# Patient Record
Sex: FEMALE | Race: WHITE | Employment: UNEMPLOYED | URBAN - METROPOLITAN AREA
[De-identification: names, ages, dates, MRNs, and addresses within clinical notes are randomized per-mention and may not be internally consistent; named-entity substitution may affect disease eponyms.]

---

## 2021-01-18 ENCOUNTER — IMMUNIZATION (OUTPATIENT)
Dept: PEDIATRICS | Facility: CLINIC | Age: 56
End: 2021-01-18
Payer: COMMERCIAL

## 2021-01-18 PROCEDURE — 91300 PR COVID VAC PFIZER DIL RECON 30 MCG/0.3 ML IM: CPT

## 2021-01-18 PROCEDURE — 0001A PR COVID VAC PFIZER DIL RECON 30 MCG/0.3 ML IM: CPT

## 2021-02-08 ENCOUNTER — IMMUNIZATION (OUTPATIENT)
Dept: PEDIATRICS | Facility: CLINIC | Age: 56
End: 2021-02-08
Attending: OTOLARYNGOLOGY
Payer: COMMERCIAL

## 2021-02-08 PROCEDURE — 91300 PR COVID VAC PFIZER DIL RECON 30 MCG/0.3 ML IM: CPT

## 2021-02-08 PROCEDURE — 0002A PR COVID VAC PFIZER DIL RECON 30 MCG/0.3 ML IM: CPT

## 2021-03-07 ENCOUNTER — HEALTH MAINTENANCE LETTER (OUTPATIENT)
Age: 56
End: 2021-03-07

## 2021-10-04 ENCOUNTER — HOSPITAL ENCOUNTER (EMERGENCY)
Facility: CLINIC | Age: 56
Discharge: HOME | End: 2021-10-05
Attending: EMERGENCY MEDICINE | Admitting: EMERGENCY MEDICINE
Payer: COMMERCIAL

## 2021-10-04 DIAGNOSIS — T78.40XA ALLERGIC REACTION, INITIAL ENCOUNTER: ICD-10-CM

## 2021-10-04 PROCEDURE — 96374 THER/PROPH/DIAG INJ IV PUSH: CPT

## 2021-10-04 PROCEDURE — 99285 EMERGENCY DEPT VISIT HI MDM: CPT | Mod: 25

## 2021-10-04 PROCEDURE — 250N000011 HC RX IP 250 OP 636: Performed by: EMERGENCY MEDICINE

## 2021-10-04 PROCEDURE — 96375 TX/PRO/DX INJ NEW DRUG ADDON: CPT

## 2021-10-04 PROCEDURE — 250N000009 HC RX 250: Performed by: EMERGENCY MEDICINE

## 2021-10-04 RX ORDER — METHYLPREDNISOLONE SODIUM SUCCINATE 125 MG/2ML
125 INJECTION, POWDER, LYOPHILIZED, FOR SOLUTION INTRAMUSCULAR; INTRAVENOUS ONCE
Status: COMPLETED | OUTPATIENT
Start: 2021-10-04 | End: 2021-10-04

## 2021-10-04 RX ADMIN — METHYLPREDNISOLONE SODIUM SUCCINATE 125 MG: 125 INJECTION, POWDER, FOR SOLUTION INTRAMUSCULAR; INTRAVENOUS at 23:21

## 2021-10-04 RX ADMIN — FAMOTIDINE 20 MG: 10 INJECTION, SOLUTION INTRAVENOUS at 23:23

## 2021-10-04 RX ADMIN — EPINEPHRINE 0.3 MG: 1 INJECTION INTRAMUSCULAR; INTRAVENOUS; SUBCUTANEOUS at 23:12

## 2021-10-04 ASSESSMENT — ENCOUNTER SYMPTOMS
WHEEZING: 0
VOMITING: 0
FEVER: 0
NAUSEA: 0
ABDOMINAL PAIN: 0
SHORTNESS OF BREATH: 0
CHILLS: 0
DIARRHEA: 0

## 2021-10-04 ASSESSMENT — MIFFLIN-ST. JEOR: SCORE: 1369.26

## 2021-10-05 VITALS
OXYGEN SATURATION: 98 % | TEMPERATURE: 97.5 F | WEIGHT: 160 LBS | HEART RATE: 72 BPM | RESPIRATION RATE: 18 BRPM | SYSTOLIC BLOOD PRESSURE: 122 MMHG | HEIGHT: 68 IN | BODY MASS INDEX: 24.25 KG/M2 | DIASTOLIC BLOOD PRESSURE: 78 MMHG

## 2021-10-05 RX ORDER — EPINEPHRINE 0.3 MG/.3ML
0.3 INJECTION SUBCUTANEOUS ONCE
Qty: 0.3 ML | Refills: 0 | Status: SHIPPED | OUTPATIENT
Start: 2021-10-05 | End: 2021-10-05

## 2021-10-05 RX ORDER — FAMOTIDINE 20 MG/1
20 TABLET, FILM COATED ORAL 2 TIMES DAILY
Qty: 10 TABLET | Refills: 0 | Status: SHIPPED | OUTPATIENT
Start: 2021-10-05 | End: 2021-10-10

## 2021-10-05 RX ORDER — PREDNISONE 20 MG/1
40 TABLET ORAL DAILY
Qty: 10 TABLET | Refills: 0 | Status: SHIPPED | OUTPATIENT
Start: 2021-10-05 | End: 2021-10-10

## 2021-10-05 NOTE — ED NOTES
Pt rounding done. Pt lip is visibly less swollen as well as her tongue. Pt reports her voice sounds more like it usually does as well. Pt denies any sx at this time and reports feeling ready for discharge.

## 2021-10-05 NOTE — ED TRIAGE NOTES
Bit into an apple and developed swelling to lips and tongue. Also stated throat feels tight. Took benadryl about 45 minutes ago. Denies any sob currently.

## 2021-10-05 NOTE — ED NOTES
Pt discharge instructions reviewed. Use of epi pen at home, when and how to use it, as well as coming back to the ED following use, reviewed. Pt informed of when to return to the ED and how to care for self w/ any future reactions. Verbalized understanding. Denies needing anything prior to discharge. Airway and breathing normal and intact upon discharge.

## 2021-10-05 NOTE — ED NOTES
Pt rounding done, pt reports some improvement in sx following med adm. Pt denies needing anything at this time. Pt appears to be resting comfortably, airway intact, respirations regular and unlabored.

## 2021-10-05 NOTE — ED PROVIDER NOTES
History     Chief Complaint:  Oral Swelling      HPI   Ashley Ramirez is a 55 year old female who presents with tongue and lip swelling.  Patient reports that she was eating an apple this evening and shortly thereafter began experiencing significant swelling of her lower lip and tongue.  She notes that this is happened before she does not wash fruit sufficiently she will have a reaction to likely one of the pesticides on the skin of the flu.  She also notes significant allergy to bees and typically carries an EpiPen but did not have one with her this evening.  She took 2 Benadryl prior to coming into the emergency department and reports she feels mild improvement.  She denies any significant shortness of breath, nausea, vomiting, abdominal pain, dizziness, rash or itchiness.    Review of Systems   Constitutional: Negative for chills and fever.   HENT:        Lower lip and tongue swelling   Respiratory: Negative for shortness of breath and wheezing.    Cardiovascular: Negative for chest pain.   Gastrointestinal: Negative for abdominal pain, diarrhea, nausea and vomiting.   All other systems reviewed and are negative.    Allergies:  No Known Allergies    Medications:    EPINEPHrine (ANY BX GENERIC EQUIV) 0.3 MG/0.3ML injection 2-pack  famotidine (PEPCID) 20 MG tablet  predniSONE (DELTASONE) 20 MG tablet        Past Medical History:    No pertinent past medical history    Past Surgical History:    No pertinent past surgical history    Family History:    No family history of angioedema    Social History:  Presents to the emergency department by herself    Physical Exam     Patient Vitals for the past 24 hrs:   BP Temp Temp src Pulse Resp SpO2 Height Weight   10/05/21 0045 -- -- -- 64 -- 96 % -- --   10/05/21 0040 -- -- -- 63 -- 95 % -- --   10/05/21 0035 -- -- -- 75 -- 98 % -- --   10/05/21 0030 133/82 -- -- 74 -- 99 % -- --   10/05/21 0010 -- -- -- 60 -- 97 % -- --   10/05/21 0000 132/80 -- -- 64 -- 97 % -- --  "  10/04/21 2345 (!) 143/83 -- -- 60 -- 98 % -- --   10/04/21 2330 (!) 140/87 -- -- 65 -- 98 % -- --   10/04/21 2310 (!) 147/93 -- -- 74 -- -- -- --   10/04/21 2300 (!) 157/96 97.5  F (36.4  C) Temporal 75 18 98 % 1.727 m (5' 8\") 72.6 kg (160 lb)       Physical Exam  General: Patient is awake, alert and interactive when I enter the room  Head: The scalp, face, and head appear normal  Eyes: The pupils are equal, round, and reactive to light. Conjunctivae and sclerae are normal  ENT: Lower lip and mild frontal tongue swelling.  Able to tolerate secretions.  Neck: Normal range of motion.   CV: Regular rate and rhythm.   Resp: Lungs are clear without wheezes or rales. No respiratory distress.   GI: Abdomen is soft, no rigidity, guarding, or rebound. No distension. No tenderness to palpation in any quadrant.     MS: Normal tone. Joints grossly normal without effusions. No asymmetric leg swelling, calf or thigh tenderness.    Skin: No rash or lesions noted. Normal capillary refill noted  Neuro: Speech is normal and fluent. Face is symmetric. Moving all extremities.   Psych:  Normal affect.  Appropriate interactions.    Emergency Department Course     Emergency Department Course:    Reviewed:  I reviewed nursing notes, vitals and past history    Assessments:  2310 I obtained history and examined the patient as noted above.   0004 I rechecked the patient and she reports that her lip and tongue swelling have significantly improved.  We will continue to monitor the patient closely.   0056 patient continues to show steady improvement.  Comfortable with being discharged home.    Interventions:    Medications   lidocaine 1 % 0.1-1 mL (has no administration in time range)   sodium chloride (PF) 0.9% PF flush 3 mL (has no administration in time range)   sodium chloride (PF) 0.9% PF flush 3 mL (has no administration in time range)   famotidine (PEPCID) injection 20 mg (20 mg Intravenous Given 10/4/21 3723)   methylPREDNISolone " sodium succinate (solu-MEDROL) injection 125 mg (125 mg Intravenous Given 10/4/21 2321)   EPINEPHrine (ADRENALIN) kit 0.3-0.5 mg (0.3 mg Intramuscular Given 10/4/21 2312)      Disposition:  The patient was discharged to home.    Impression & Plan      Medical Decision Making:  Ashley Ramirez is a 55 year old female who presents for evaluation of lip and tongue swelling after eating an apple. She has had similar reactions like this before in the past.  Signs and symptoms are consistent with allergic reaction.  On initial physical exam, the patient has significant lip swelling and some mild swelling to her tongue.  No evidence of respiratory compromise.  She is able to tolerate her secretions currently.  Epinephrine was administered as well as Solu-Medrol, and Pepcid. We did watch here for 2 hours prior to considering discharge.  On multiple reevaluations patient showed steady improvement.  She is comfortable being discharged home. Will send home with epipen, steroids, antihistamines.  Return of anaphylactic symptoms were discussed with patient and they were instructed to inject epi-pen and call 911 should these symptoms occur.  Given the rapidity of resolution, lack of serious systemic symptoms, lack of respiratory difficulty, would not admit at this time for anaphylaxis. There is no signs of anaphylactic shock.       Covid-19  Ashley Ramirez was evaluated during a global COVID-19 pandemic, which necessitated consideration that the patient might be at risk for infection with the SARS-CoV-2 virus that causes COVID-19.   Applicable protocols for evaluation were followed during the patient's care.     Diagnosis:    ICD-10-CM    1. Allergic reaction, initial encounter  T78.40XA        Discharge Medications:  New Prescriptions    EPINEPHRINE (ANY BX GENERIC EQUIV) 0.3 MG/0.3ML INJECTION 2-PACK    Inject 0.3 mLs (0.3 mg) into the muscle once for 1 dose    FAMOTIDINE (PEPCID) 20 MG TABLET    Take 1 tablet (20 mg) by  mouth 2 times daily for 5 days    PREDNISONE (DELTASONE) 20 MG TABLET    Take 2 tablets (40 mg) by mouth daily for 5 days     MD Efrem Cobian, Bipin Pitts MD  10/05/21 0057

## 2021-10-05 NOTE — ED NOTES
Pt reports around 2130 tonight having an apple and having new onset lip and tongue swelling. Reports also having a tight throat. Took 2 benadryl at 2230. Reports no breathing difficulty. Lip visibly swollen. Hx of allergic reaction to various fruits.

## 2021-10-11 ENCOUNTER — HEALTH MAINTENANCE LETTER (OUTPATIENT)
Age: 56
End: 2021-10-11

## 2022-03-27 ENCOUNTER — HEALTH MAINTENANCE LETTER (OUTPATIENT)
Age: 57
End: 2022-03-27

## 2022-09-25 ENCOUNTER — HEALTH MAINTENANCE LETTER (OUTPATIENT)
Age: 57
End: 2022-09-25

## 2023-01-30 ENCOUNTER — HEALTH MAINTENANCE LETTER (OUTPATIENT)
Age: 58
End: 2023-01-30

## 2023-05-08 ENCOUNTER — HEALTH MAINTENANCE LETTER (OUTPATIENT)
Age: 58
End: 2023-05-08

## 2024-05-11 ENCOUNTER — HEALTH MAINTENANCE LETTER (OUTPATIENT)
Age: 59
End: 2024-05-11